# Patient Record
Sex: MALE | Race: OTHER | Employment: FULL TIME | URBAN - NONMETROPOLITAN AREA
[De-identification: names, ages, dates, MRNs, and addresses within clinical notes are randomized per-mention and may not be internally consistent; named-entity substitution may affect disease eponyms.]

---

## 2024-09-19 ENCOUNTER — HOSPITAL ENCOUNTER (EMERGENCY)
Age: 24
Discharge: HOME | End: 2024-09-19
Payer: COMMERCIAL

## 2024-09-19 VITALS
HEART RATE: 78 BPM | SYSTOLIC BLOOD PRESSURE: 117 MMHG | OXYGEN SATURATION: 96 % | RESPIRATION RATE: 18 BRPM | TEMPERATURE: 98.6 F | DIASTOLIC BLOOD PRESSURE: 67 MMHG

## 2024-09-19 DIAGNOSIS — R11.2 NAUSEA VOMITING AND DIARRHEA: ICD-10-CM

## 2024-09-19 DIAGNOSIS — H60.392 INFECTIVE OTITIS EXTERNA OF LEFT EAR: Primary | ICD-10-CM

## 2024-09-19 DIAGNOSIS — H66.002 NON-RECURRENT ACUTE SUPPURATIVE OTITIS MEDIA OF LEFT EAR WITHOUT SPONTANEOUS RUPTURE OF TYMPANIC MEMBRANE: ICD-10-CM

## 2024-09-19 DIAGNOSIS — R19.7 NAUSEA VOMITING AND DIARRHEA: ICD-10-CM

## 2024-09-19 LAB — SARS-COV-2 RDRP RESP QL NAA+PROBE: NOT  DETECTED

## 2024-09-19 PROCEDURE — 87635 SARS-COV-2 COVID-19 AMP PRB: CPT

## 2024-09-19 PROCEDURE — 99213 OFFICE O/P EST LOW 20 MIN: CPT

## 2024-09-19 PROCEDURE — 99203 OFFICE O/P NEW LOW 30 MIN: CPT

## 2024-09-19 RX ORDER — ONDANSETRON 4 MG/1
4 TABLET, ORALLY DISINTEGRATING ORAL 3 TIMES DAILY PRN
Qty: 21 TABLET | Refills: 0 | Status: SHIPPED | OUTPATIENT
Start: 2024-09-19

## 2024-09-19 RX ORDER — PREDNISONE 10 MG/1
TABLET ORAL
Qty: 20 TABLET | Refills: 0 | Status: SHIPPED | OUTPATIENT
Start: 2024-09-19 | End: 2024-09-29

## 2024-09-19 RX ORDER — HYDROCORTISONE AND ACETIC ACID 20.75; 10.375 MG/ML; MG/ML
3 SOLUTION AURICULAR (OTIC) 2 TIMES DAILY
Qty: 5 ML | Refills: 0 | Status: SHIPPED | OUTPATIENT
Start: 2024-09-19 | End: 2024-09-26

## 2024-09-19 RX ORDER — AMOXICILLIN 875 MG
875 TABLET ORAL 2 TIMES DAILY
Qty: 10 TABLET | Refills: 0 | Status: SHIPPED | OUTPATIENT
Start: 2024-09-19 | End: 2024-09-24

## 2024-09-19 ASSESSMENT — PAIN DESCRIPTION - INTENSITY: RATING_2: 5

## 2024-09-19 ASSESSMENT — PAIN DESCRIPTION - LOCATION
LOCATION: EAR
LOCATION_2: ABDOMEN

## 2024-09-19 ASSESSMENT — PAIN DESCRIPTION - ORIENTATION
ORIENTATION: LEFT
ORIENTATION_2: UPPER

## 2024-09-19 ASSESSMENT — PAIN SCALES - GENERAL: PAINLEVEL_OUTOF10: 6

## 2024-09-19 ASSESSMENT — PAIN - FUNCTIONAL ASSESSMENT: PAIN_FUNCTIONAL_ASSESSMENT: 0-10

## 2024-10-15 ENCOUNTER — HOSPITAL ENCOUNTER (EMERGENCY)
Age: 24
Discharge: HOME | End: 2024-10-15
Payer: COMMERCIAL

## 2024-10-15 VITALS
HEART RATE: 81 BPM | SYSTOLIC BLOOD PRESSURE: 131 MMHG | RESPIRATION RATE: 16 BRPM | DIASTOLIC BLOOD PRESSURE: 84 MMHG | TEMPERATURE: 98.1 F | OXYGEN SATURATION: 98 %

## 2024-10-15 DIAGNOSIS — R09.89 CHEST CONGESTION: ICD-10-CM

## 2024-10-15 DIAGNOSIS — R05.1 ACUTE COUGH: Primary | ICD-10-CM

## 2024-10-15 DIAGNOSIS — J02.9 ACUTE PHARYNGITIS, UNSPECIFIED ETIOLOGY: ICD-10-CM

## 2024-10-15 DIAGNOSIS — R11.2 NAUSEA AND VOMITING, UNSPECIFIED VOMITING TYPE: ICD-10-CM

## 2024-10-15 PROCEDURE — 99213 OFFICE O/P EST LOW 20 MIN: CPT

## 2024-10-15 RX ORDER — ONDANSETRON 4 MG/1
4 TABLET, ORALLY DISINTEGRATING ORAL 3 TIMES DAILY PRN
Qty: 21 TABLET | Refills: 0 | Status: SHIPPED | OUTPATIENT
Start: 2024-10-15

## 2024-10-15 RX ORDER — PREDNISONE 10 MG/1
TABLET ORAL
Qty: 20 TABLET | Refills: 0 | Status: SHIPPED | OUTPATIENT
Start: 2024-10-15 | End: 2024-10-25

## 2024-10-15 NOTE — ED PROVIDER NOTES
[facility] URGENT CARE      URGENT CARE     Pt Name: Alfredo Gallagher  MRN: [id number]  Birthdate 2000  Date of evaluation: 10/15/2024  Provider: TIMBO Infante CNP    Urgent Care Encounter     CHIEF COMPLAINT       Chief Complaint   Patient presents with    Cough      To the point of vomiting x 10 days      HISTORY OF PRESENT ILLNESS   Alfredo Gallagher is a 24 y.o. male who presents to urgent care chief complaint of cough/chest congestion significant phlegm.  Symptoms started over a week ago, overall staying the same and not improving.  A lot of mucus is increasing and resulting in nausea.  Nonbloody emesis a few times yesterday he attributes to increased mucus production and inability to stop coughing fits.  Unsure if he had fevers, does not thermometer but admits to chills.  Denies chest pains.  Admits to vaping approximately twice a week.    History obtained from patient  URGENT CARE TIMELINE      ED Course as of 10/15/24 1544   Tue Oct 15, 2024   1535 BP: 131/84  Vital signs are stable.  Afebrile. [JR]      ED Course User Index  [JR] Anshu Durand APRN - CNP     PAST MEDICAL HISTORY         Diagnosis Date    Hyperlipidemia      SURGICALHISTORY     Patient  has no past surgical history on file.  CURRENT MEDICATIONS       Discharge Medication List as of 10/15/2024  3:42 PM        ALLERGIES     Patient is has No Known Allergies.  Patients   There is no immunization history on file for this patient.  FAMILY HISTORY     Patient's family history is not on file.  SOCIAL HISTORY     Patient  reports that he has never smoked. He has never used smokeless tobacco. He reports that he does not drink alcohol and does not use drugs.  PHYSICAL EXAM     ED TRIAGE VITALS  BP: 131/84, Temp: 98.1 °F (36.7 °C), Pulse: 81, Respirations: 16, SpO2: 98 %,There is no height or weight on file to calculate BMI.,No LMP for male patient.  Physical Exam  Vitals and nursing note reviewed.   Constitutional:        Psychiatric:         Mood and Affect: Mood normal.         Behavior: Behavior normal.       DIAGNOSTIC RESULTS   Labs:No results found for this visit on 10/15/24.  IMAGING:  No orders to display     EKG:  URGENT CARE COURSE:     Vitals:    10/15/24 1516   BP: 131/84   Pulse: 81   Resp: 16   Temp: 98.1 °F (36.7 °C)   TempSrc: Temporal   SpO2: 98%     Medications - No data to display  PROCEDURES: (None if blank)  Procedures:   FINAL IMPRESSION      1. Acute cough    2. Acute pharyngitis, unspecified etiology    3. Nausea and vomiting, unspecified vomiting type    4. Chest congestion      DISPOSITION/ PLAN   PATIENT REFERRED TO:  No primary care provider on file.  No primary physician on file.  DISCHARGE MEDICATIONS:  Discharge Medication List as of 10/15/2024  3:42 PM        START taking these medications    Details   amoxicillin-clavulanate (AUGMENTIN) 875-125 MG per tablet Take 1 tablet by mouth 2 times daily for 7 days, Disp-14 tablet, R-0Normal      predniSONE (DELTASONE) 10 MG tablet Take 4 tablets by mouth once daily for 5 days, Disp-20 tablet, R-0Normal           Discharge Medication List as of 10/15/2024  3:42 PM        Discharge Medication List as of 10/15/2024  3:42 PM        CONTINUE these medications which have CHANGED    Details   ondansetron (ZOFRAN-ODT) 4 MG disintegrating tablet Take 1 tablet by mouth 3 times daily as needed for Nausea or Vomiting, Disp-21 tablet, R-0Normal           TIMBO Infante CNP    (Please note that portions of this note were completed with a voice recognition program. Efforts were made to edit the dictations but occasionally words are mis-transcribed.)            Anshu Durand APRN - CNP  10/15/24 [id number]

## 2024-10-15 NOTE — DISCHARGE INSTRUCTIONS
Please take antibiotics as prescribed for the recommended duration even if you are feeling better.    Hydrate well keeping urine clear/pale yellow.  Side effects of antibiotics including sensitivity to sun, diarrhea and may make you more vulnerable to opportunistic infections.  Please use probiotics and practice good hand hygiene while you are taking his medications.    Please see your family doctor if symptoms fail to improve or sooner if they worsen.    If any of your symptoms are urgent/emergent or out-of-control please report to urgent care/ER or call 911.    I hope you are feeling better soon!